# Patient Record
Sex: FEMALE | NOT HISPANIC OR LATINO | URBAN - METROPOLITAN AREA
[De-identification: names, ages, dates, MRNs, and addresses within clinical notes are randomized per-mention and may not be internally consistent; named-entity substitution may affect disease eponyms.]

---

## 2023-11-01 ENCOUNTER — APPOINTMENT (OUTPATIENT)
Dept: PRIMARY CARE | Facility: CLINIC | Age: 73
End: 2023-11-01
Payer: MEDICARE

## 2023-11-15 PROBLEM — F41.8 ANXIETY WITH DEPRESSION: Status: ACTIVE | Noted: 2023-03-08

## 2023-11-15 PROBLEM — G25.0 ESSENTIAL TREMOR: Status: ACTIVE | Noted: 2023-03-08

## 2023-11-15 PROBLEM — E66.9 OBESITY, CLASS I, BMI 30-34.9: Status: ACTIVE | Noted: 2023-11-15

## 2023-11-15 PROBLEM — I63.9 CEREBROVASCULAR ACCIDENT (CVA) (MULTI): Status: ACTIVE | Noted: 2023-03-08

## 2023-11-15 PROBLEM — F03.90 DEMENTIA WITHOUT BEHAVIORAL DISTURBANCE (MULTI): Status: ACTIVE | Noted: 2023-03-08

## 2023-11-15 PROBLEM — F33.9 DEPRESSION, RECURRENT (CMS-HCC): Status: ACTIVE | Noted: 2023-05-30

## 2023-11-15 PROBLEM — M48.062 SPINAL STENOSIS, LUMBAR REGION WITH NEUROGENIC CLAUDICATION: Status: ACTIVE | Noted: 2023-07-26

## 2024-03-19 ENCOUNTER — OFFICE VISIT (OUTPATIENT)
Dept: PRIMARY CARE | Facility: CLINIC | Age: 74
End: 2024-03-19
Payer: COMMERCIAL

## 2024-03-19 VITALS
WEIGHT: 173 LBS | BODY MASS INDEX: 32.66 KG/M2 | HEIGHT: 61 IN | SYSTOLIC BLOOD PRESSURE: 136 MMHG | DIASTOLIC BLOOD PRESSURE: 62 MMHG

## 2024-03-19 DIAGNOSIS — I10 HYPERTENSION, UNSPECIFIED TYPE: ICD-10-CM

## 2024-03-19 DIAGNOSIS — I63.9 CEREBROVASCULAR ACCIDENT (CVA), UNSPECIFIED MECHANISM (MULTI): ICD-10-CM

## 2024-03-19 DIAGNOSIS — M81.0 AGE RELATED OSTEOPOROSIS, UNSPECIFIED PATHOLOGICAL FRACTURE PRESENCE: ICD-10-CM

## 2024-03-19 DIAGNOSIS — F03.90 DEMENTIA WITHOUT BEHAVIORAL DISTURBANCE (MULTI): Primary | ICD-10-CM

## 2024-03-19 DIAGNOSIS — Z12.11 ENCOUNTER FOR SCREENING COLONOSCOPY: ICD-10-CM

## 2024-03-19 DIAGNOSIS — Z13.220 LIPID SCREENING: ICD-10-CM

## 2024-03-19 DIAGNOSIS — R53.83 OTHER FATIGUE: ICD-10-CM

## 2024-03-19 DIAGNOSIS — E03.9 HYPOTHYROIDISM, UNSPECIFIED TYPE: ICD-10-CM

## 2024-03-19 DIAGNOSIS — N19 RENAL FAILURE, UNSPECIFIED CHRONICITY: ICD-10-CM

## 2024-03-19 DIAGNOSIS — R73.03 PRE-DIABETES: ICD-10-CM

## 2024-03-19 PROBLEM — N18.32 STAGE 3B CHRONIC KIDNEY DISEASE (MULTI): Status: ACTIVE | Noted: 2024-02-05

## 2024-03-19 PROBLEM — J42 CHRONIC BRONCHITIS (MULTI): Status: ACTIVE | Noted: 2024-02-05

## 2024-03-19 PROBLEM — F31.9 BIPOLAR DISORDER (MULTI): Status: ACTIVE | Noted: 2024-02-05

## 2024-03-19 PROCEDURE — 1159F MED LIST DOCD IN RCRD: CPT | Performed by: INTERNAL MEDICINE

## 2024-03-19 PROCEDURE — 99204 OFFICE O/P NEW MOD 45 MIN: CPT | Performed by: INTERNAL MEDICINE

## 2024-03-19 PROCEDURE — 1036F TOBACCO NON-USER: CPT | Performed by: INTERNAL MEDICINE

## 2024-03-19 PROCEDURE — 3075F SYST BP GE 130 - 139MM HG: CPT | Performed by: INTERNAL MEDICINE

## 2024-03-19 PROCEDURE — 3078F DIAST BP <80 MM HG: CPT | Performed by: INTERNAL MEDICINE

## 2024-03-19 RX ORDER — TRAMADOL HYDROCHLORIDE 50 MG/1
50-100 TABLET ORAL EVERY 8 HOURS PRN
COMMUNITY
Start: 2024-03-13 | End: 2024-03-19 | Stop reason: ALTCHOICE

## 2024-03-19 ASSESSMENT — ENCOUNTER SYMPTOMS
LOSS OF SENSATION IN FEET: 0
OCCASIONAL FEELINGS OF UNSTEADINESS: 0
DEPRESSION: 0

## 2024-03-20 NOTE — PROGRESS NOTES
"Subjective   Patient ID: Lawanda Rodriguez is a 73 y.o. female who presents for New Patient Visit.    This 73-year-old white lady came to my office first time.  I welcomed her.  Her concerns are:  1. She was told she has a dementia and memory loss.  She is wondering.  She does not want to end up in a nursing home.  2. She is looking for a new physician.  She wants refill on medications.  She is worried about stroke follow-up.  She had a stroke several years ago.  There is no follow-up.    CURRENT MEDICATIONS: Rosuvastatin and calcium.    SOCIAL HISTORY: She has one biological child.    HEALTH MAINTENANCE: She never had a colonoscopy.  She will consider it.  She had a bone density.    IMMUNIZATION: She had a pneumonia and shingles shot.    I have personally reviewed the patient's Past Medical History, Medications, Allergies, Social History, and Family History in the EMR.    Review of Systems   All other systems reviewed and are negative.  The patient never had a heart attack.  No diabetes and no cancer.  She had a stroke, left-sided weak.    Objective   /62   Ht 1.549 m (5' 1\")   Wt 78.5 kg (173 lb)   BMI 32.69 kg/m²     Physical Exam  Vitals reviewed.   HENT:      Right Ear: Tympanic membrane, ear canal and external ear normal.      Left Ear: Tympanic membrane, ear canal and external ear normal.   Eyes:      General: No scleral icterus.     Pupils: Pupils are equal, round, and reactive to light.   Neck:      Vascular: No carotid bruit.   Cardiovascular:      Heart sounds: Normal heart sounds, S1 normal and S2 normal. No murmur heard.     No friction rub.   Pulmonary:      Effort: Pulmonary effort is normal.      Breath sounds: Normal breath sounds and air entry.   Abdominal:      Palpations: There is no hepatomegaly, splenomegaly or mass.   Musculoskeletal:         General: No swelling or deformity. Normal range of motion.      Cervical back: Neck supple.      Right lower leg: No edema.      Left lower leg: No " edema.   Lymphadenopathy:      Cervical: No cervical adenopathy.      Upper Body:      Right upper body: No axillary adenopathy.      Left upper body: No axillary adenopathy.      Lower Body: No right inguinal adenopathy. No left inguinal adenopathy.   Neurological:      Mental Status: She is oriented to person, place, and time.      Cranial Nerves: Cranial nerves 2-12 are intact. No cranial nerve deficit.      Sensory: No sensory deficit.      Motor: Weakness (left side) present.      Gait: Gait is intact.      Deep Tendon Reflexes: Reflexes normal.   Psychiatric:         Mood and Affect: Mood normal. Mood is not anxious or depressed. Affect is not angry.         Behavior: Behavior is not agitated.         Thought Content: Thought content normal.         Judgment: Judgment normal.     LAB WORK: Laboratory testing ordered.    Assessment/Plan   Problem List Items Addressed This Visit             ICD-10-CM       Neuro    Cerebrovascular accident (CVA) (CMS/[facility]) I63.9    Relevant Orders    MR brain w and wo IV contrast    Dementia without behavioral disturbance (CMS/[facility]) - Primary F03.90     Other Visit Diagnoses         Codes    Encounter for screening colonoscopy     Z12.11    Relevant Orders    Colonoscopy Screening; Average Risk Patient    Other fatigue     R53.83    Relevant Orders    CBC    Ammonia    Vitamin B12    Magnesium    Thyroid Stimulating Hormone    Urinalysis with Reflex Microscopic    Lipid screening     Z13.220    Relevant Orders    Comprehensive Metabolic Panel    Lipid Panel    Pre-diabetes     R73.03    Relevant Orders    Hemoglobin A1C    Age related osteoporosis, unspecified pathological fracture presence     M81.0    Relevant Orders    XR DEXA bone density    Hypertension, unspecified type     I10    Renal failure, unspecified chronicity     N19    Hypothyroidism, unspecified type     E03.9        1. Question of dementia.  She does not recall any MRI or mental status exam done.  I will  immediately order MRI for stroke follow up as well dementia and going to check the level.  2. Hypertension.  Hydrochlorothiazide helping.  We will check her electrolytes.  3. She was told she has phase 3 kidney failure.  We are going to verify, do the blood work.  Keep an eye.  4. Cholesterol, stable.  5. Thyroid, okay.  6. Health maintenance.  Bone density ordered.  I urged her to do colonoscopy.  She will think about it. Mammogram ordered.  7. Skin.  A couple of moles and freckles especially on right side of the cheek caught my eye.  I am going to refer to Derm for possible biopsy.  8. Complete blood work ordered.  9. I shall see her back in a couple of weeks after tests.    Scribe Attestation  By signing my name below, I, Keisha Boyd, Yamini attest that this documentation has been prepared under the direction and in the presence of Gael Malone MD.

## 2024-03-22 ENCOUNTER — APPOINTMENT (OUTPATIENT)
Dept: RADIOLOGY | Facility: CLINIC | Age: 74
End: 2024-03-22
Payer: COMMERCIAL

## 2024-03-25 NOTE — PROGRESS NOTES
[facility]  New Patient Office Visit Note 3/26/2024    Patient Information: Sachi Rodriguez, MRN: [id number], : 1950   Primary Care/Referring Physician: Gael Malone MD, [address] [facility], [address] / *     Chief Complaint: ***  History of Pain: Ms. Sachi Rodriguez is a 73 y.o. female with a PMHx of *** who presents for low back and leg pain (L>R).    Was seen by ortho spine with plans for multi-level lumbar decompression. There was discussion that this may not improve her back pain so was referred for SCS (??was referred to Dr. Chilel)    Current Pain Medications:  Previously Tried Pain Medications:    Relevant Surgeries: ***  Injections: Left L3 TFESI - 2 weeks of pain relief  Physical/Occupational Therapy: ***The patient does not wish to pursue PT/OT at this time.    Medications:   Current Outpatient Medications   Medication Instructions    aspirin 81 mg, oral, Daily RT    donepezil (Aricept) 5 mg tablet 1 tablet, oral, Nightly    escitalopram (LEXAPRO) 20 mg, oral, Daily RT    gabapentin (NEURONTIN) 600 mg, oral, 3 times daily    hydroCHLOROthiazide (MICROZIDE) 12.5 mg, oral, Daily RT    ipratropium (Atrovent) 42 mcg (0.06 %) nasal spray 1 spray    losartan (COZAAR) 25 mg, oral, Daily    propranolol (INDERAL) 10 mg, oral, 3 times daily    traZODone (DESYREL) 50 mg, oral    Trelegy Ellipta 200-62.5-25 mcg blister with device 1 puff    Ventolin HFA 90 mcg/actuation inhaler 1 puff, inhalation      Allergies: No Known Allergies    Past Medical & Surgical History:  Past Medical History:   Diagnosis Date    Arthritis     High cholesterol     History of stroke     Hypertension     Postmenopausal     Uterine cancer (CMS/HCC)       Past Surgical History:   Procedure Laterality Date    HYSTERECTOMY      KNEE SURGERY      both    SHOULDER SURGERY      both       Family History   Problem Relation Name Age of Onset    Tuberculosis Mother       Social History      Socioeconomic History    Marital status:      Spouse name: Not on file    Number of children: 1    Years of education: Not on file    Highest education level: Not on file   Occupational History    Occupation: Retired   Tobacco Use    Smoking status: Never    Smokeless tobacco: Never   Substance and Sexual Activity    Alcohol use: Yes     Alcohol/week: 1.0 standard drink of alcohol     Types: 1 Cans of beer per week    Drug use: Never    Sexual activity: Not on file   Other Topics Concern    Not on file   Social History Narrative    Not on file     Social Determinants of Health     Financial Resource Strain: Not on file   Food Insecurity: Not on file   Transportation Needs: Not on file   Physical Activity: Not on file   Stress: Not on file   Social Connections: Not on file   Intimate Partner Violence: Not on file   Housing Stability: Not on file       Problems, Past medical history, past surgical history, Medications, allergies, social and family history reviewed and as per the electronic medical record from today's encounter    Review of Systems:  CONST: No fever, chills, fatigue, weight changes  EYES: No loss of vision  ENT: No hearing loss, tinnitus  CV: No chest pain, palpitations  RESP: No dyspnea, shortness of breath, cough  GI: No stool incontinence, nausea, vomiting  : No urinary incontinence  MSK: No joint swelling  SKIN: No rash, no hives  NEURO: No headache, dizziness, weakness, paresthesias  PSYCH: No anxiety, depression or suicidal ideation  HEM/LYMPH: No easy bruising or bleeding  All other systems reviewed are negative     Physical Exam:  Vitals: There were no vitals taken for this visit.  General: No apparent distress. Alert, appropriate, oriented x 3. Mood and affect normal. Speaking in full sentences.  HENT: Normocephalic, atraumatic. Hearing intact.  Eyes: Extraocular movements grossly intact. Pupils equal and round.   Neck: Supple, trachea midline.  Lungs: Symmetric respiratory  "excursion on visual exam, nonlabored breathing.  Extremities: No clubbing, cyanosis, or edema noted in arms or legs.  Skin: No rashes, lesions, alopecia noted on back or extremities.   MSK: ***  Neck: Flexion, extension, rotation and lateral bending does not exacerbate pain. No tenderness over the spinous processes, cervical paraspinal muscles, or bilateral trapezius muscles.  Back: Flexion, extension, rotation and lateral bending does*** exacerbate pain. No tenderness over the spinous processes, lumbar facets, SIJ, or GTB bilaterally. SUSAN test and straight leg raise test negative bilaterally. No spasm noted over musculature. No misalignment or asymmetry noted.  Neuro: Alert and appropriate. Motor strength 5/5 throughout bilateral upper and lower extremities. Sensory intact to light touch bilateral upper and lower extremities. Gait within normal limits. Bulk and tone within normal limits.    Laboratory Data:  The following laboratory data were reviewed during this visit:   No results found for: \"WBC\", \"RBC\", \"HGB\", \"HCT\", \"PLT\"   No results found for: \"INR\"  Lab Results   Component Value Date    HGBA1C 5.5 02/12/2024       Imaging:  The following imaging impressions were reviewed by me during this visit:    MR LUMBAR SPINE WO CONTRAST 12/24/2023    Narrative  * * *Final Report* * *    DATE OF EXAM: Dec 24 2023 11:17AM    MYM   0303  -  MRI LUMBAR SPINE WO IVCON  / ACCESSION #  [id number]    PROCEDURE REASON: Spinal stenosis of lumbar region with neurogenic  claudication    * * * * Physician Interpretation * * * *    EXAMINATION:  MRI CERVICAL SPINE WO IVCON, MRI LUMBAR SPINE WO IVCON    CLINICAL HISTORY: Spinal stenosis, spondylolisthesis, torticollis,  radiculopathy, trauma (accession [id number]), Spinal stenosis,  spondylolisthesis, radiculopathy, trauma (accession [id number])    TECHNIQUE: Routine cervical and lumbosacral spine MR protocol without  gadolinium.  MQ: [id number]    COMPARISON: Cervical spine lumbar " spine radiographs 12/18/2023    RESULT:    CERVICAL:    Counting reference:  Craniocervical junction.   Anatomic Variants:  None.    Localizer images:  No significant findings.    Alignment:   Straightening of the cervical lordosis. Mild-moderate  intervertebral disc space narrowing at C5-6, C6-7 and C7-T1.    Craniocervical junction:    Craniocervical junction is normal.    Cord:  Multilevel cord approximation/abutment due to spondylotic changes,  further detailed below. No intramedullary signal abnormality.    Bone marrow signal/fracture:    No evidence of pathologic marrow  infiltration.  No evidence of prior fracture.    Soft tissues:    The paraspinal soft tissues are within normal limits.    C2-C3: Small central disc protrusion approximating the ventral cord.  Patent foramina.    C3-C4: Shallow disc/osteophyte complex approximating the ventral cord.  Moderate left foraminal stenosis due to asymmetric uncovertebral and  facet hypertrophy. Patent right foramen.    C4-C5: Shallow disc/disc bulging approximating the ventral cord. Moderate  left foraminal stenosis due to asymmetric uncovertebral and facet  hypertrophy. Patent right foramen.    C5-C6: Shallow disc/osteophyte complex abutting the ventral cord. Mild  bilateral foraminal stenosis due to uncovertebral and facet hypertrophy.    C6-C7: Shallow disc/osteophyte complex abutting the ventral cord. At  least mild bilateral foraminal stenosis due to uncovertebral and facet  hypertrophy.    C7-T1: Shallow disc/osteophyte complex abutting the ventral cord.  Moderate bilateral foraminal stenosis due to uncovertebral and facet  hypertrophy.    At T2-3, mild-moderate bilateral foraminal stenosis. No high-grade canal  or foraminal compromise in the remaining imaged thoracic spine to the  level of T5-6.    LUMBAR:    Counting reference:  Craniocervical and lumbosacral junctions  For the  purposes of this report,  L4-5 is considered the level of the iliac crest  and  assume there are 5 lumbar-type vertebrae.  Anatomic variant:  None.    Localizer images:  No significant findings.    Alignment:   Straightening of the lumbar lordosis. Multilevel moderate  intervertebral disc space narrowing.    Bone marrow signal/fracture: Small intraosseous hemangioma within the T11  vertebral body. Confluent type I degenerative endplate changes at L3-4  and L4-5. No evidence of pathologic marrow infiltration.  No evidence of  prior fracture.    Conus:    The conus is within normal limits of signal intensity and  morphology.    Paraspinal soft tissues:    Paraspinal soft tissues are within normal  limits.    Lower thoracic spine:  Patent.    L1-L2:    Disc/osteophyte complex, facet and ligamentous hypertrophy;  patent canal with narrowing of each subarticular zone, patent foramina.    L2-L3:    Disc/osteophyte complex, facet arthropathy, ligamentous  hypertrophy; mild canal stenosis with narrowing of each subarticular  zone, mild bilateral foraminal stenosis.    L3-L4:    Disc/osteophyte complex, facet and ligamentous hypertrophy,  prominent dorsal epidural fat; moderate narrowing of the thecal sac,  mild-moderate bilateral foraminal stenosis.    L4-L5:    Diffuse disc bulging with superimposed small right  central/paracentral disc extrusion, facet arthropathy, ligamentous  hypertrophy, prominent dorsal epidural fat; at least moderate narrowing  of the thecal sac and asymmetric narrowing of the right subarticular zone  possibly impinging of the descending right L5 nerve root, moderate-severe  right and mild-moderate left foraminal stenosis.    L5-S1:    Diffuse disc bulging with superimposed broad-based shallow disc  protrusion, facet hypertrophy; patent canal with asymmetric narrowing of  the left subarticular zone possibly impinging on the descending left S1  nerve root, moderate right and severe left foraminal stenosis.    Sacrum and iliac wings:   The visualized sacrum and iliac wings  are  within normal limits.  The presacral soft tissues are normal in  appearance.    Impression  IMPRESSION:    Cervical spondylosis with levels of mild canal stenosis, multilevel cord  approximation/abutment without intramedullary signal abnormality, and  varying degrees of mild-moderate foraminal compromise.    Lumbar spondylosis, most significant at L4-5 and L5-S1, as detailed.    Cervical Anatomic Variant: None. Assume 7 cervical vertebrae with  counting from the craniocervical junction.    Anatomic Lumbar Variant: None. L4-5 is considered the level of the iliac  crest and assume there are 5 lumbar-type vertebrae.          : PSCB  Transcribe Date/Time: Dec 24 2023  1:25P    Dictated by : KIRSTIN ARANGO MD    This examination was interpreted and the report reviewed and  electronically signed by:  KIRSTIN ARANGO MD on Dec 24 2023  1:33PM  EST       I also personally reviewed the images from the above studies myself. These images and my interpretation of them contributed to the management and decision making of the patient's medical plan.    ASSESSMENT:  Ms. Sachi Rodriguez is a 73 y.o. female with *** pain that is consistent with:    No diagnosis found.    PLAN:    Diagnostics: No further diagnostics are indicated at this time.    Physical Therapy and Rehabilitation:     - ***    Psychologically:  - ***    Medications  - ***  Duration  - ***    Interventions:  - ***        Sincerely,  Porter Jacobo MD  [facility] [place]

## 2024-03-26 ENCOUNTER — APPOINTMENT (OUTPATIENT)
Dept: PAIN MEDICINE | Facility: CLINIC | Age: 74
End: 2024-03-26
Payer: COMMERCIAL

## 2024-03-27 ENCOUNTER — OFFICE VISIT (OUTPATIENT)
Dept: PAIN MEDICINE | Facility: CLINIC | Age: 74
End: 2024-03-27
Payer: COMMERCIAL

## 2024-03-27 VITALS
HEIGHT: 60 IN | RESPIRATION RATE: 18 BRPM | WEIGHT: 173 LBS | SYSTOLIC BLOOD PRESSURE: 130 MMHG | HEART RATE: 57 BPM | OXYGEN SATURATION: 93 % | BODY MASS INDEX: 33.96 KG/M2 | DIASTOLIC BLOOD PRESSURE: 80 MMHG

## 2024-03-27 DIAGNOSIS — M47.816 LUMBAR FACET ARTHROPATHY: ICD-10-CM

## 2024-03-27 DIAGNOSIS — M54.16 LUMBAR RADICULOPATHY: ICD-10-CM

## 2024-03-27 DIAGNOSIS — M48.062 SPINAL STENOSIS, LUMBAR REGION WITH NEUROGENIC CLAUDICATION: Primary | ICD-10-CM

## 2024-03-27 PROCEDURE — 99204 OFFICE O/P NEW MOD 45 MIN: CPT | Performed by: STUDENT IN AN ORGANIZED HEALTH CARE EDUCATION/TRAINING PROGRAM

## 2024-03-27 PROCEDURE — 1159F MED LIST DOCD IN RCRD: CPT | Performed by: STUDENT IN AN ORGANIZED HEALTH CARE EDUCATION/TRAINING PROGRAM

## 2024-03-27 PROCEDURE — 1160F RVW MEDS BY RX/DR IN RCRD: CPT | Performed by: STUDENT IN AN ORGANIZED HEALTH CARE EDUCATION/TRAINING PROGRAM

## 2024-03-27 PROCEDURE — 1036F TOBACCO NON-USER: CPT | Performed by: STUDENT IN AN ORGANIZED HEALTH CARE EDUCATION/TRAINING PROGRAM

## 2024-03-27 PROCEDURE — 99214 OFFICE O/P EST MOD 30 MIN: CPT | Performed by: STUDENT IN AN ORGANIZED HEALTH CARE EDUCATION/TRAINING PROGRAM

## 2024-03-27 PROCEDURE — 1125F AMNT PAIN NOTED PAIN PRSNT: CPT | Performed by: STUDENT IN AN ORGANIZED HEALTH CARE EDUCATION/TRAINING PROGRAM

## 2024-03-27 RX ORDER — FLUOXETINE HYDROCHLORIDE 20 MG/1
20 CAPSULE ORAL DAILY
COMMUNITY

## 2024-03-27 ASSESSMENT — ENCOUNTER SYMPTOMS
LOSS OF SENSATION IN FEET: 0
OCCASIONAL FEELINGS OF UNSTEADINESS: 1
DEPRESSION: 1

## 2024-03-27 ASSESSMENT — PAIN DESCRIPTION - DESCRIPTORS: DESCRIPTORS: SHARP;ACHING

## 2024-03-27 ASSESSMENT — PAIN - FUNCTIONAL ASSESSMENT: PAIN_FUNCTIONAL_ASSESSMENT: 0-10

## 2024-03-27 ASSESSMENT — PAIN SCALES - GENERAL
PAINLEVEL_OUTOF10: 6
PAINLEVEL: 6

## 2024-03-27 ASSESSMENT — PATIENT HEALTH QUESTIONNAIRE - PHQ9
SUM OF ALL RESPONSES TO PHQ9 QUESTIONS 1 AND 2: 2
10. IF YOU CHECKED OFF ANY PROBLEMS, HOW DIFFICULT HAVE THESE PROBLEMS MADE IT FOR YOU TO DO YOUR WORK, TAKE CARE OF THINGS AT HOME, OR GET ALONG WITH OTHER PEOPLE: SOMEWHAT DIFFICULT
2. FEELING DOWN, DEPRESSED OR HOPELESS: NOT AT ALL
1. LITTLE INTEREST OR PLEASURE IN DOING THINGS: MORE THAN HALF THE DAYS

## 2024-03-27 ASSESSMENT — LIFESTYLE VARIABLES: TOTAL SCORE: 7

## 2024-03-27 NOTE — PROGRESS NOTES
[facility] Pain Management  New Patient Office Visit Note 3/27/2024    Patient Information: Sachi Rodriguez, MRN: [id number], : 1950   Primary Care/Referring Physician: Gael Malone MD, [address] [facility], [address] / *     Chief Complaint: Low back and bilateral leg pain  History of Pain: Ms. Sachi Rodriguez is a 73 y.o. female with a PMHx of HTN, CKD, CVA (, residual left sided weakness and numbness), anxiety, depression who presents for low back and leg pain (L>R).    She reports onset of pain approximately 30 years ago, with progressive worsening starting in 2018. She describes a sharp pain in her left low back with radiation down into her bilateral anterior thighs. Her pain worsens significantly with prolonged walking and standing. She has to lean on a shopping cart for pain relief. If she is sitting she has minimal pain. At baseline she has numbness and weakness in her left arm and leg since a stroke in . She denies any numbness or tingling in her right leg.     Was seen by ortho spine at [facility] with plans for multi-level lumbar decompression. There was discussion that this may not improve her back pain so the patient was interested in seeking alternatives. She has read about SCS and appears to have some interest in the device.    Current Pain Medications: OTC Ibuprofen, Gabapentin - only takes 600 mg nightly because it makes her tired during the day  Previously Tried Pain Medications: Tramadol, Duloxetine, Cyclobenzaprine    Relevant Surgeries: Denies history of lumbar spine surgery  Injections: Left L3 TFESI - 2 weeks of pain relief. Has undergone cervical RFA in [place] with benefit  Physical/Occupational Therapy: She has tried PT but it made her pain worse    Medications:   Current Outpatient Medications   Medication Instructions    aspirin 81 mg, oral, Daily RT    donepezil (Aricept) 5 mg tablet 1 tablet, oral, Nightly    escitalopram (LEXAPRO) 20 mg, oral, Daily RT     FLUoxetine (PROZAC) 20 mg, oral, Daily    gabapentin (NEURONTIN) 600 mg, oral, 3 times daily    hydroCHLOROthiazide (MICROZIDE) 12.5 mg, oral, Daily RT    ipratropium (Atrovent) 42 mcg (0.06 %) nasal spray 1 spray    losartan (COZAAR) 25 mg, oral, Daily    propranolol (INDERAL) 10 mg, oral, 3 times daily    traZODone (DESYREL) 50 mg, oral    Trelegy Ellipta 200-62.5-25 mcg blister with device 1 puff    Ventolin HFA 90 mcg/actuation inhaler 1 puff, inhalation      Allergies: No Known Allergies    Past Medical & Surgical History:  Past Medical History:   Diagnosis Date    Arthritis     Back pain     Bipolar disorder (CMS/Formerly [facility]) 1960    Chronic pain     Chronic pain disorder 1989    High cholesterol     History of stroke     Hypertension     Low back pain 1989    Postmenopausal     Spinal stenosis 2021    Uterine cancer (CMS/Formerly [facility])       Past Surgical History:   Procedure Laterality Date    HYSTERECTOMY      KNEE SURGERY      both    SHOULDER SURGERY      both       Family History   Problem Relation Name Age of Onset    Tuberculosis Mother       Social History     Socioeconomic History    Marital status:      Spouse name: Not on file    Number of children: 1    Years of education: Not on file    Highest education level: Not on file   Occupational History    Occupation: Retired   Tobacco Use    Smoking status: Former     Types: Cigarettes    Smokeless tobacco: Never   Substance and Sexual Activity    Alcohol use: Yes     Alcohol/week: 1.0 standard drink of alcohol     Types: 1 Cans of beer per week     Comment: Maybe monthly    Drug use: Yes     Types: Marijuana     Comment: 1-2 times weekly-smoke    Sexual activity: Not Currently     Partners: Choose not to disclose     Birth control/protection: Ring   Other Topics Concern    Not on file   Social History Narrative    Not on file     Social Determinants of Health     Financial Resource Strain: Not on file   Food Insecurity: Not on file   Transportation Needs: Not  on file   Physical Activity: Not on file   Stress: Not on file   Social Connections: Not on file   Intimate Partner Violence: Not on file   Housing Stability: Not on file       Problems, Past medical history, past surgical history, Medications, allergies, social and family history reviewed and as per the electronic medical record from today's encounter    Review of Systems:  CONST: No fever, chills, fatigue, weight changes  EYES: No loss of vision  ENT: No hearing loss, tinnitus  CV: No chest pain, palpitations  RESP: No dyspnea, shortness of breath, cough  GI: No stool incontinence, nausea, vomiting  : No urinary incontinence  MSK: No joint swelling  SKIN: No rash, no hives  NEURO: No headache, dizziness  PSYCH: Hx of anxiety and depression  HEM/LYMPH: No easy bruising or bleeding  All other systems reviewed are negative     Physical Exam:  Vitals: /80   Pulse 57   Resp 18   Ht 1.524 m (5')   Wt 78.5 kg (173 lb)   SpO2 93%   BMI 33.79 kg/m²   General: No apparent distress. Alert, appropriate, oriented x 3. Mood and affect normal. Speaking in full sentences.  HENT: Normocephalic, atraumatic. Hearing intact.  Eyes: Extraocular movements grossly intact. Pupils equal and round.   Neck: Supple, trachea midline.  Lungs: Symmetric respiratory excursion on visual exam, nonlabored breathing.  Extremities: No clubbing, cyanosis, or edema noted in arms or legs.  Skin: No rashes, lesions, alopecia noted on back or extremities.   Back: Reports pain with extension and lumbar facet loading maneuvers bilaterally, worse to the left. No pain with flexion. No tenderness over the spinous processes, SIJ, or GTB bilaterally. Reports pain to palpation overlying bilateral lower lumbar paraspinal muscles, worse on the left. No spasm noted over musculature. No misalignment or asymmetry noted.  Neuro: Alert and appropriate. Motor strength 5/5 throughout right lower extremity and 4+/5 throughout left lower extremity. Sensory  "intact to light touch in right lower extremity and diminished in entirety of left lower extremity. Bulk and tone within normal limits.    Laboratory Data:  The following laboratory data were reviewed during this visit:   No results found for: \"WBC\", \"RBC\", \"HGB\", \"HCT\", \"PLT\"   No results found for: \"INR\"  Lab Results   Component Value Date    HGBA1C 5.5 02/12/2024       Imaging:  The following imaging impressions were reviewed by me during this visit:    -12/24/23 lumbar spine MRI (per report, unable to review images) shows type I degenerative endplate changes at L3-4 and L4-5. L3-L4 disc/osteophyte complex, facet and ligamentous hypertrophy, prominent dorsal epidural fat causing moderate narrowing of the thecal sac, mild-moderate bilateral foraminal stenosis. L4-L5 diffuse disc bulging with superimposed small right central/paracentral disc extrusion, facet arthropathy, ligamentous hypertrophy, prominent dorsal epidural fat causing at least moderate narrowing of the thecal sac and asymmetric narrowing of the right subarticular zone possibly impinging of the descending right L5 nerve root, moderate-severe right and mild-moderate left foraminal stenosis. L5-S1 diffuse disc bulging with superimposed broad-based shallow disc protrusion, facet hypertrophy with asymmetric narrowing of the left subarticular zone possibly impinging on the descending left S1 nerve root, moderate right and severe left foraminal stenosis.      I also personally reviewed the images from the above studies myself. These images and my interpretation of them contributed to the management and decision making of the patient's medical plan.    ASSESSMENT:  Ms. Sachi Rodriguez is a 73 y.o. female with low back and leg pain that is consistent with:    1. Spinal stenosis, lumbar region with neurogenic claudication    2. Lumbar facet arthropathy    3. Lumbar radiculopathy        PLAN:    Diagnostics:   - She has recently had a lumbar spine MRI. I " requested she bring her lumbar spine MRI on a CD for me to personally review    Physical Therapy and Rehabilitation:     - She has attempted PT in the past with worsening of her pain. Recommend she continue a HEP    Psychologically:  - No needs at this time    Medications  - No changes to medication today    Duration  - Multiple years    Interventions:  - I suspect her pain is primarily secondary to lumbar spinal stenosis with neurogenic claudication, with possible contributions from lumbar facet arthropathy and lumbar radiculopathy. I will await the results of her lumbar spine MRI prior to discussing options. She could be a candidate for the MILD or Vertiflex procedure. I did briefly discuss SCS since she appeared interested in this technology, but told her that generally SCS is not my initial therapy of choice for lumbar spinal stenosis        Sincerely,  Porter Jacobo MD  [facility] [place]

## 2024-04-03 ENCOUNTER — APPOINTMENT (OUTPATIENT)
Dept: RADIOLOGY | Facility: CLINIC | Age: 74
End: 2024-04-03
Payer: COMMERCIAL

## 2024-04-05 ENCOUNTER — APPOINTMENT (OUTPATIENT)
Dept: RADIOLOGY | Facility: CLINIC | Age: 74
End: 2024-04-05
Payer: COMMERCIAL

## 2024-05-07 ENCOUNTER — APPOINTMENT (OUTPATIENT)
Dept: GASTROENTEROLOGY | Facility: EXTERNAL LOCATION | Age: 74
End: 2024-05-07
Payer: COMMERCIAL

## 2024-06-04 ENCOUNTER — APPOINTMENT (OUTPATIENT)
Dept: DERMATOLOGY | Facility: CLINIC | Age: 74
End: 2024-06-04
Payer: COMMERCIAL